# Patient Record
Sex: FEMALE | Race: WHITE
[De-identification: names, ages, dates, MRNs, and addresses within clinical notes are randomized per-mention and may not be internally consistent; named-entity substitution may affect disease eponyms.]

---

## 2018-08-31 ENCOUNTER — HOSPITAL ENCOUNTER (OUTPATIENT)
Dept: HOSPITAL 92 - SCSMAMMO | Age: 53
Discharge: HOME | End: 2018-08-31
Attending: OBSTETRICS & GYNECOLOGY
Payer: COMMERCIAL

## 2018-08-31 DIAGNOSIS — Z12.31: Primary | ICD-10-CM

## 2018-08-31 PROCEDURE — 77067 SCR MAMMO BI INCL CAD: CPT

## 2018-09-04 NOTE — MMO
BILATERAL SCREENING MAMMOGRAM:

 

HISTORY:

A 53-year-old female for screening mammography.

 

COMPARISON:

07/13/2012

 

FINDINGS:

Bilateral MLO and CC views of the breasts show scattered fibroglandular breast tissue.  There is no e
vidence of suspicious mass, suspicious cluster of microcalcifications, or area of architectural disto
rtion.

 

Interpretation of this mammogram was performed with the assistance of computer aided detection.

 

IMPRESSION:

BI-RADS Category 1-Negative.

 

Annual screening mammography is recommended.

 

POS: [facility]